# Patient Record
Sex: FEMALE | Race: WHITE
[De-identification: names, ages, dates, MRNs, and addresses within clinical notes are randomized per-mention and may not be internally consistent; named-entity substitution may affect disease eponyms.]

---

## 2021-04-19 ENCOUNTER — HOSPITAL ENCOUNTER (OUTPATIENT)
Dept: HOSPITAL 63 - CT | Age: 66
End: 2021-04-19
Attending: NURSE PRACTITIONER
Payer: MEDICARE

## 2021-04-19 DIAGNOSIS — N20.0: ICD-10-CM

## 2021-04-19 DIAGNOSIS — K57.30: ICD-10-CM

## 2021-04-19 DIAGNOSIS — N28.1: Primary | ICD-10-CM

## 2021-04-19 PROCEDURE — 74176 CT ABD & PELVIS W/O CONTRAST: CPT

## 2021-04-19 NOTE — RAD
EXAM: Abdomen and pelvis CT without intravenous contrast.



HISTORY: Hematuria.



TECHNIQUE: Computed tomographic images of the abdomen and pelvis were obtained without contrast. Mult
iplanar reformatting was performed.



*One or more of the following individualized dose reduction techniques were utilized for this examina
tion:  

1. Automated exposure control.  

2. Adjustment of the mA and/or kV according to patient size.  

3. Use of iterative reconstruction technique.



COMPARISON: None.



FINDINGS: Evaluation of the lower thorax demonstrates a few tiny groundglass opacity likely due to at
electasis. No suspicious pulmonary nodule or infiltrate is seen. No hepatic lesion is seen. The gallb
ladder, pancreas, spleen, stomach and adrenal glands are unremarkable.



There are small lateral renal parapelvic cysts. There is a small lateral exophytic right renal cortic
al cyst. There may also be a cyst within the lower pole the left kidney. There is a 1 mm nonobstructi
ng left renal stone. There is no hydronephrosis. No convincing renal lesion is seen on this noncontra
st exam. There is a 2 mm calcification abutting a 1.4 cm hyperdense lesion within the left bladder ba
se near the ureterovesical junction. The bladder is otherwise unremarkable.



There are calcified uterine fibroids. The ovaries are unremarkable. There is no appendicitis. There i
s no bowel obstruction. There is chronic diverticulosis. There is no evidence of diverticulitis. The 
aorta is normal in caliber. There is no lymphadenopathy. There is lumbar scoliosis and hyperlordosis.
 There is grade 1 anterolisthesis of L4 on L5. There is no suspicious osseous lesion.



IMPRESSION: 

1. 1.4 cm hyperdense lesion with adjacent 2 mm calcification or stone within the left bladder base. T
he imaging appearance is concerning for neoplasm rather than blood clot in this patient with a histor
y of hematuria. Correlate with cystoscopy or a CT urogram.

2. Small bilateral renal parapelvic cysts and right renal cortical cyst. There may also be a cyst wit
hin the lower pole the left kidney, difficult to assess in the absence of contrast. Renal sonography 
can be performed to confirm benignity.

3. Tiny nonobstructing left renal stone.

4. Colonic diverticulosis.



Electronically signed by: Geeta Valderrama MD (4/19/2021 10:24 AM) [id number]

## 2021-05-25 ENCOUNTER — HOSPITAL ENCOUNTER (OUTPATIENT)
Dept: HOSPITAL 63 - LAB | Age: 66
End: 2021-05-25
Attending: UROLOGY
Payer: MEDICARE

## 2021-05-25 DIAGNOSIS — D41.4: ICD-10-CM

## 2021-05-25 DIAGNOSIS — Z01.812: Primary | ICD-10-CM

## 2021-05-25 DIAGNOSIS — Z20.822: ICD-10-CM

## 2021-05-25 PROCEDURE — U0003 INFECTIOUS AGENT DETECTION BY NUCLEIC ACID (DNA OR RNA); SEVERE ACUTE RESPIRATORY SYNDROME CORONAVIRUS 2 (SARS-COV-2) (CORONAVIRUS DISEASE [COVID-19]), AMPLIFIED PROBE TECHNIQUE, MAKING USE OF HIGH THROUGHPUT TECHNOLOGIES AS DESCRIBED BY CMS-2020-01-R: HCPCS
